# Patient Record
Sex: FEMALE | URBAN - METROPOLITAN AREA
[De-identification: names, ages, dates, MRNs, and addresses within clinical notes are randomized per-mention and may not be internally consistent; named-entity substitution may affect disease eponyms.]

---

## 2018-08-02 ENCOUNTER — PRE VISIT (OUTPATIENT)
Dept: ORTHOPEDICS | Facility: CLINIC | Age: 33
End: 2018-08-02

## 2018-08-02 NOTE — TELEPHONE ENCOUNTER
FUTURE VISIT INFORMATION      FUTURE VISIT INFORMATION:    Date: 8/6    Time: 1:00    [place] [facility] [place]  REFERRAL INFORMATION:    Referring provider:      Referring providers clinic:      Reason for visit/diagnosis  L knee pain    RECORDS REQUESTED FROM:       Clinic name Comments Records Status Imaging Status                                         RECORDS STATUS      No records in chart. Left vm with pt regarding any records or images that she can bring with her

## 2018-08-06 ENCOUNTER — RADIANT APPOINTMENT (OUTPATIENT)
Dept: GENERAL RADIOLOGY | Facility: CLINIC | Age: 33
End: 2018-08-06
Attending: ORTHOPAEDIC SURGERY
Payer: COMMERCIAL

## 2018-08-06 ENCOUNTER — OFFICE VISIT (OUTPATIENT)
Dept: ORTHOPEDICS | Facility: CLINIC | Age: 33
End: 2018-08-06
Payer: COMMERCIAL

## 2018-08-06 VITALS — WEIGHT: 142 LBS | HEIGHT: 60 IN | BODY MASS INDEX: 27.88 KG/M2

## 2018-08-06 DIAGNOSIS — M25.562 CHRONIC PAIN OF LEFT KNEE: Primary | ICD-10-CM

## 2018-08-06 DIAGNOSIS — G89.29 CHRONIC PAIN OF LEFT KNEE: Primary | ICD-10-CM

## 2018-08-06 DIAGNOSIS — M25.562 CHRONIC PAIN OF LEFT KNEE: ICD-10-CM

## 2018-08-06 DIAGNOSIS — G89.29 CHRONIC PAIN OF LEFT KNEE: ICD-10-CM

## 2018-08-06 DIAGNOSIS — M17.32 POST-TRAUMATIC OSTEOARTHRITIS OF LEFT KNEE: ICD-10-CM

## 2018-08-06 RX ORDER — CHOLECALCIFEROL (VITAMIN D3) 50 MCG
2000 TABLET ORAL
COMMUNITY
End: 2018-08-06

## 2018-08-06 RX ORDER — ALPRAZOLAM 0.25 MG
0.25 TABLET ORAL
COMMUNITY
Start: 2017-10-26

## 2018-08-06 RX ORDER — LIDOCAINE HYDROCHLORIDE 10 MG/ML
2 INJECTION, SOLUTION EPIDURAL; INFILTRATION; INTRACAUDAL; PERINEURAL
Status: SHIPPED | OUTPATIENT
Start: 2018-08-06

## 2018-08-06 RX ORDER — SERTRALINE HYDROCHLORIDE 100 MG/1
200 TABLET, FILM COATED ORAL
COMMUNITY
Start: 2017-10-26

## 2018-08-06 RX ORDER — TRIAMCINOLONE ACETONIDE 40 MG/ML
40 INJECTION, SUSPENSION INTRA-ARTICULAR; INTRAMUSCULAR
Status: SHIPPED | OUTPATIENT
Start: 2018-08-06

## 2018-08-06 RX ORDER — GABAPENTIN 100 MG/1
100 CAPSULE ORAL
COMMUNITY
Start: 2017-10-26

## 2018-08-06 RX ADMIN — LIDOCAINE HYDROCHLORIDE 2 ML: 10 INJECTION, SOLUTION EPIDURAL; INFILTRATION; INTRACAUDAL; PERINEURAL at 14:05

## 2018-08-06 RX ADMIN — TRIAMCINOLONE ACETONIDE 40 MG: 40 INJECTION, SUSPENSION INTRA-ARTICULAR; INTRAMUSCULAR at 14:05

## 2018-08-06 ASSESSMENT — KOOS JR
HOW SEVERE IS YOUR KNEE STIFFNESS AFTER FIRST WAKING IN MORNING: MODERATE
HOW SEVERE IS YOUR KNEE STIFFNESS AFTER FIRST WAKING IN MORNING: 1

## 2018-08-06 NOTE — PROGRESS NOTES
"Large Joint Injection/Arthocentesis  Date/Time: 8/6/2018 2:05 PM  Performed by: VERONICA OVALLE  Authorized by: VERONICA OVALLE     Indications:  Pain  Needle Size:  25 G  Guidance: landmark guided    Approach:  Anterolateral  Location:  Knee  Site:  L knee joint  Medications:  40 mg triamcinolone acetonide 40 MG/ML; 2 mL lidocaine (PF) 1 %  Outcome:  Tolerated well, no immediate complications  Procedure discussed: discussed risks, benefits, and alternatives    Timeout: timeout called immediately prior to procedure    Prep: patient was prepped and draped in usual sterile fashion        C C: left knee pain    HPI:  Abby is seen today with chief complaint of left lateral knee pain. She has a history of a lateral uni done by Dr. Ovalle about 10 years ago with near complete pain relief. Her mother is here today as well and states there has been a 2 week history of worsening knee pain without specific injury, fall, or obvious trauma.Takes occasional advil and does have \"night achiness\". Mild swelling.     PMH: Reviewed in chart 8/6/2018    ROS: Reviewed on patient tablet today 8/6/2018 with all systems negative except for those listed below    Meds/All: Reviewed/updated 8/6/2018    PE:  Pleasant and cooperative female alert and oriented x3. Has spastic lower extremity on gait analysis from CP. Left knee demonstrates mild tenderness to palpate lateral joint line. Nontender to medial joint. Patella has crepitus that is not painful with flexion/extension. No calf pain. Ligament exam is stable. Skin intact without rashes, bruises, or lesions.    Xrays taken today:  1. Postsurgical changes of a right knee lateral femorotibial  unicompartmental arthroplasty.  2. Joint space narrowing in the medial and lateral femorotibial joint  compartment of the left knee.       Dx:  1.stable right knee lateral uniarthroplasty  2. Early/mild osteoarthritis left lateral joint knee    Plan:  1. No surgery recommended at this point. NSAIDS " and a cortisone injection today. Informed consent obtained. Follow up prn.    Total time spent was 30 minutes with greater than 50% spent in face to face consultation and collaboration of care.    Procedure Note    Pre Procedure dx: left knee osteoarthritis    Post Procedure dx: left knee osteoarthritis    Procedure: Informed consent obtained. Using sterile technique, the anterior lateral aspect of the left knee was prepped with chlorahexadine x2. 1cc of 40 mg of kenalog and 2 ml of lidocaine was injected into the lateral joint space without difficulty. Follow up as indicated above.    I, Dr. Don Ovalle, have seen and examined this patient with Sole Banerjee, APRN, CNP.

## 2018-08-06 NOTE — LETTER
"8/6/2018       RE: Abby Aguilera  [address] [address]     Dear Colleague,    Thank you for referring your patient, Abby Aguilera, [facility]. Please see a copy of my visit note below.    Large Joint Injection/Arthocentesis  Date/Time: 8/6/2018 2:05 PM  Performed by: VERONICA OVALLE  Authorized by: VERONICA OVALLE     Indications:  Pain  Needle Size:  25 G  Guidance: landmark guided    Approach:  Anterolateral  Location:  Knee  Site:  L knee joint  Medications:  40 mg triamcinolone acetonide 40 MG/ML; 2 mL lidocaine (PF) 1 %  Outcome:  Tolerated well, no immediate complications  Procedure discussed: discussed risks, benefits, and alternatives    Timeout: timeout called immediately prior to procedure    Prep: patient was prepped and draped in usual sterile fashion        C C: left knee pain    HPI:  Abby is seen today with chief complaint of left lateral knee pain. She has a history of a lateral uni done by Dr. Ovalle about 10 years ago with near complete pain relief. Her mother is here today as well and states there has been a 2 week history of worsening knee pain without specific injury, fall, or obvious trauma.Takes occasional advil and does have \"night achiness\". Mild swelling.     PMH: Reviewed in chart 8/6/2018    ROS: Reviewed on patient tablet today 8/6/2018 with all systems negative except for those listed below    Meds/All: Reviewed/updated 8/6/2018    PE:  Pleasant and cooperative female alert and oriented x3. Has spastic lower extremity on gait analysis from CP. Left knee demonstrates mild tenderness to palpate lateral joint line. Nontender to medial joint. Patella has crepitus that is not painful with flexion/extension. No calf pain. Ligament exam is stable. Skin intact without rashes, bruises, or lesions.    Xrays taken today:  1. Postsurgical changes of a right knee lateral femorotibial  unicompartmental " arthroplasty.  2. Joint space narrowing in the medial and lateral femorotibial joint  compartment of the left knee.       Dx:  1.stable right knee lateral uniarthroplasty  2. Early/mild osteoarthritis left lateral joint knee    Plan:  1. No surgery recommended at this point. NSAIDS and a cortisone injection today. Informed consent obtained. Follow up prn.    Total time spent was 30 minutes with greater than 50% spent in face to face consultation and collaboration of care.    Procedure Note    Pre Procedure dx: left knee osteoarthritis    Post Procedure dx: left knee osteoarthritis    Procedure: Informed consent obtained. Using sterile technique, the anterior lateral aspect of the left knee was prepped with chlorahexadine x2. 1cc of 40 mg of kenalog and 2 ml of lidocaine was injected into the lateral joint space without difficulty. Follow up as indicated above.    I, Dr. Don Ovalle, have seen and examined this patient with ALOK Fletcher, CNP.      Again, thank you for allowing me to participate in the care of your patient.      Sincerely,    Don Ovalle MD

## 2018-08-06 NOTE — MR AVS SNAPSHOT
After Visit Summary   2018    Abby Aguilera    MRN: [id number]           Patient Information     Date Of Birth          1985        Visit Information        Provider Department      2018 1:00 PM Don [facility]le [facility]        Today's Diagnoses     Chronic pain of left knee    -  1    Post-traumatic osteoarthritis of left knee           Follow-ups after your visit        Who to contact     Please call your clinic at [phone] to:    Ask questions about your health    Make or cancel appointments    Discuss your medicines    Learn about your test results    Speak to your doctor            Additional Information About Your Visit        MyChart Information     Workboard is an electronic gateway that provides easy, online access to your medical records. With Workboard, you can request a clinic appointment, read your test results, renew a prescription or communicate with your care team.     To sign up for [employer] visit the website at [email or web address]   You will be asked to enter the access code listed below, as well as some personal information. Please follow the directions to create your username and password.     Your access code is: [email or web address]  Expires: 10/31/2018  6:30 AM     Your access code will  in 90 days. If you need help or a new code, please contact your [facility] or call [phone] for assistance.        Care EveryWhere ID     This is your Care EveryWhere ID. This could be used by other organizations to access your [place] medical records  [id number]        Your Vitals Were     Height BMI (Body Mass Index)                1.524 m (5') 27.73 kg/m2           Blood Pressure from Last 3 Encounters:   No data found for BP    Weight from Last 3 Encounters:   18 64.4 kg (142 lb)              We Performed the Following     Large Joint Injection/Arthocentesis          Today's Medication Changes           These changes are accurate as of 8/6/18  4:30 PM.  If you have any questions, ask your nurse or doctor.               Stop taking these medicines if you haven't already. Please contact your care team if you have questions.     vitamin D 2000 units tablet   Stopped by:  Don Ovalle MD                    Primary Care Provider    None Specified       No primary provider on file.        Equal Access to Services     [facility]: Hadii aad ku hadasho Soomaali, waaxda luqadaha, qaybta kaalmada sarayada, charu sagastumejustynbennie estes . So [facility] [phone].    ATENCIÓN: Si habla español, tiene a camarena disposición servicios gratuitos de asistencia lingüística. Llame al [phone].    We comply with applicable federal civil rights laws and [place] laws. We do not discriminate on the basis of race, color, national origin, age, disability, sex, sexual orientation, or gender identity.            Thank you!     Thank you for choosing [facility]  for your care. Our goal is always to provide you with excellent care. Hearing back from our patients is one way we can continue to improve our services. Please take a few minutes to complete the written survey that you may receive in the mail after your visit with us. Thank you!             Your Updated Medication List - Protect others around you: Learn how to safely use, store and throw away your medicines at www.disposemymeds.org.          This list is accurate as of 8/6/18  4:30 PM.  Always use your most recent med list.                   Brand Name Dispense Instructions for use Diagnosis    ALPRAZolam 0.25 MG tablet    XANAX     Take 0.25 mg by mouth        gabapentin 100 MG capsule    NEURONTIN     Take 100 mg by mouth        sertraline 100 MG tablet    ZOLOFT     Take 200 mg by mouth

## 2018-08-06 NOTE — NURSING NOTE
Reason For Visit:   Chief Complaint   Patient presents with     Knee Pain     Left knee pain for about 2 weeks. No known TRAVIS.            Pain Assessment  Patient Currently in Pain: Yes  0-10 Pain Scale: 4  Primary Pain Location: Knee  Pain Orientation: Left  Pain Descriptors: Aching, Dull  Alleviating Factors: Rest  Aggravating Factors: Movement

## 2018-08-06 NOTE — LETTER
Date:August 7, 2018      Patient was self referred, no letter generated. Do not send.        [facility] Information

## 2019-01-07 ENCOUNTER — TELEPHONE (OUTPATIENT)
Dept: ORTHOPEDICS | Facility: CLINIC | Age: 34
End: 2019-01-07

## 2019-01-07 NOTE — TELEPHONE ENCOUNTER
Mel was phoned back by RN and voicemail was left that Dr Ovalle would refer the pt to podiatry for foot sores.  If pt needed surgery for foot deformity, Dr Ovalle may be recommended.  Until then, foot sores are taken care of by podiatry.  Letha was left my direct dial number fif more questions arise.  Michelle Pruitt RN

## 2019-01-07 NOTE — TELEPHONE ENCOUNTER
[facility]    Phone Message    May a detailed message be left on voicemail: yes    Reason for Call: Other: Pt 's mother called and stated that pt is having a lot of trouble with pain and sores on her feet. Pt's mother wanted to check with  team to see who he would recommend her daughter seeing for this. Pt's Mother felt pt should be seen with an orthopedic surgeon asnd not a podiatrist being her daughter has Cerebral Palsy. Please call pt's mother back with any recommendations.     Action Taken: Message routed to:  [facility] ortho

## 2019-01-22 ENCOUNTER — OFFICE VISIT (OUTPATIENT)
Dept: PODIATRY | Facility: CLINIC | Age: 34
End: 2019-01-22
Payer: COMMERCIAL

## 2019-01-22 ENCOUNTER — ANCILLARY PROCEDURE (OUTPATIENT)
Dept: GENERAL RADIOLOGY | Facility: CLINIC | Age: 34
End: 2019-01-22
Payer: COMMERCIAL

## 2019-01-22 VITALS — SYSTOLIC BLOOD PRESSURE: 114 MMHG | HEART RATE: 89 BPM | OXYGEN SATURATION: 97 % | DIASTOLIC BLOOD PRESSURE: 78 MMHG

## 2019-01-22 DIAGNOSIS — M79.671 RIGHT FOOT PAIN: Primary | ICD-10-CM

## 2019-01-22 DIAGNOSIS — M24.573 EQUINUS CONTRACTURE OF ANKLE: ICD-10-CM

## 2019-01-22 PROCEDURE — 99202 OFFICE O/P NEW SF 15 MIN: CPT | Performed by: PODIATRIST

## 2019-01-22 PROCEDURE — 73630 X-RAY EXAM OF FOOT: CPT | Mod: RT | Performed by: RADIOLOGY

## 2019-01-22 NOTE — LETTER
1/22/2019         RE: Abby Aguilera  [address] [address]        Dear Colleague,    Thank you for referring your patient, Abby Aguilera, to the [facility]. Please see a copy of my visit note below.    No past medical history on file.  There is no problem list on file for this patient.    No past surgical history on file.  Social History     Socioeconomic History     Marital status: Single     Spouse name: Not on file     Number of children: Not on file     Years of education: Not on file     Highest education level: Not on file   Social Needs     Financial resource strain: Not on file     Food insecurity - worry: Not on file     Food insecurity - inability: Not on file     Transportation needs - medical: Not on file     Transportation needs - non-medical: Not on file   Occupational History     Not on file   Tobacco Use     Smoking status: Never Smoker     Smokeless tobacco: Never Used   Substance and Sexual Activity     Alcohol use: Not on file     Drug use: Not on file     Sexual activity: Not on file   Other Topics Concern     Not on file   Social History Narrative     Not on file     No family history on file.  SUBJECTIVE FINDINGS:  A 33-year-old female presents with her mother for right foot pain.  She relates it has been going on for weeks.  She is not exactly sure how.  She relates no injuries.  It kind of hurts on the dorsolateral foot and ankle area.  She uses a walker to get around.  She works on her feet.  If she is working on it too long, it hurts.  It is better with rest.  She relates no treatment.  Left foot:  She has got a callus on it.  She thought maybe it was from the orthotics.  They got the orthotics adjusted and that helped.  She relates she has had Achilles tendon releases in the past.  She has AFOs that she intermittently wears, but they are hard to get on.  She has custom foot orthotics that she wears in her shoes.      PAST MEDICAL HISTORY:  She  sees primary care at [facility].  This includes preventative health care, infantile cerebral palsy, generalized anxiety disorder, obsessive compulsive disorder, chronic pain, left knee, chronic pain, right knee.      OBJECTIVE FINDINGS:  DP and PT are 2/4 bilaterally.  She has decreased ankle joint dorsiflexion, right more so than left.  She has some talar pronation on the right with some prominence on the medial arch.  She has a dorsolateral fifth MPJ prominence on the left with minimal callus on it.  She has hyperkeratotic tissue buildup, plantar left hallux, minimally on the right.  There are no gross tendon voids.  She has pain on palpation of the anterolateral foot and ankle gutter.  There is no erythema, no drainage, no odor, no calor bilaterally.  X-rays reviewed with the patient and mother in clinic today.  There is no fracture, joint cortical margins are intact.  She does have some joint space narrowing on the tarsal and intertarsal joints on the right foot laterally.      ASSESSMENT AND PLAN:  Right foot pain.  She is getting some lateral foot and ankle impingement.  She has got equinus present.  Diagnosis and treatment options discussed with the patient.  Prescription for physical therapy given and use discussed with her.  She will return to clinic and see me in about 6 weeks.         Again, thank you for allowing me to participate in the care of your patient.        Sincerely,        Zhang Banegas DPM

## 2019-01-22 NOTE — PROGRESS NOTES
No past medical history on file.  There is no problem list on file for this patient.    No past surgical history on file.  Social History     Socioeconomic History     Marital status: Single     Spouse name: Not on file     Number of children: Not on file     Years of education: Not on file     Highest education level: Not on file   Social Needs     Financial resource strain: Not on file     Food insecurity - worry: Not on file     Food insecurity - inability: Not on file     Transportation needs - medical: Not on file     Transportation needs - non-medical: Not on file   Occupational History     Not on file   Tobacco Use     Smoking status: Never Smoker     Smokeless tobacco: Never Used   Substance and Sexual Activity     Alcohol use: Not on file     Drug use: Not on file     Sexual activity: Not on file   Other Topics Concern     Not on file   Social History Narrative     Not on file     No family history on file.  SUBJECTIVE FINDINGS:  A 33-year-old female presents with her mother for right foot pain.  She relates it has been going on for weeks.  She is not exactly sure how.  She relates no injuries.  It kind of hurts on the dorsolateral foot and ankle area.  She uses a walker to get around.  She works on her feet.  If she is working on it too long, it hurts.  It is better with rest.  She relates no treatment.  Left foot:  She has got a callus on it.  She thought maybe it was from the orthotics.  They got the orthotics adjusted and that helped.  She relates she has had Achilles tendon releases in the past.  She has AFOs that she intermittently wears, but they are hard to get on.  She has custom foot orthotics that she wears in her shoes.      PAST MEDICAL HISTORY:  She sees primary care at [facility].  This includes preventative health care, infantile cerebral palsy, generalized anxiety disorder, obsessive compulsive disorder, chronic pain, left knee, chronic pain, right knee.      OBJECTIVE FINDINGS:  DP and  PT are 2/4 bilaterally.  She has decreased ankle joint dorsiflexion, right more so than left.  She has some talar pronation on the right with some prominence on the medial arch.  She has a dorsolateral fifth MPJ prominence on the left with minimal callus on it.  She has hyperkeratotic tissue buildup, plantar left hallux, minimally on the right.  There are no gross tendon voids.  She has pain on palpation of the anterolateral foot and ankle gutter.  There is no erythema, no drainage, no odor, no calor bilaterally.  X-rays reviewed with the patient and mother in clinic today.  There is no fracture, joint cortical margins are intact.  She does have some joint space narrowing on the tarsal and intertarsal joints on the right foot laterally.      ASSESSMENT AND PLAN:  Right foot pain.  She is getting some lateral foot and ankle impingement.  She has got equinus present.  Diagnosis and treatment options discussed with the patient.  Prescription for physical therapy given and use discussed with her.  She will return to clinic and see me in about 6 weeks.

## 2019-01-22 NOTE — NURSING NOTE
Abby Aguilera's chief complaint for this visit includes:  Chief Complaint   Patient presents with     Consult For     foot pain     PCP: Carla Sheehan    Referring Provider:  No referring provider defined for this encounter.    /78   Pulse 89   SpO2 97%   Data Unavailable     Do you need any medication refills at today's visit? no

## 2019-01-22 NOTE — PATIENT INSTRUCTIONS
Thanks for coming today.  [facility] Clinic  [address] [place] [address]    To schedule future appointments in Ortho Clinic, you may call [phone].    To schedule ordered imaging by your Provider: Call [facility] at [phone]    [email or web address] available online at:   [email or web address]    Please call if any further questions or concerns [phone] and ask for the Orthopedic Department. Clinic hours 8 am to 5 pm.    Return to clinic if symptoms worsen.

## 2019-03-05 ENCOUNTER — OFFICE VISIT (OUTPATIENT)
Dept: PODIATRY | Facility: CLINIC | Age: 34
End: 2019-03-05
Payer: COMMERCIAL

## 2019-03-05 VITALS — SYSTOLIC BLOOD PRESSURE: 116 MMHG | DIASTOLIC BLOOD PRESSURE: 76 MMHG | OXYGEN SATURATION: 97 % | HEART RATE: 70 BPM

## 2019-03-05 DIAGNOSIS — M24.573 EQUINUS CONTRACTURE OF ANKLE: ICD-10-CM

## 2019-03-05 DIAGNOSIS — M79.671 RIGHT FOOT PAIN: Primary | ICD-10-CM

## 2019-03-05 PROCEDURE — 99212 OFFICE O/P EST SF 10 MIN: CPT | Performed by: PODIATRIST

## 2019-03-05 NOTE — LETTER
3/5/2019         RE: Abby Aguilera  [address] [address]        Dear Colleague,    Thank you for referring your patient, Abby Aguilera, to the [email or web address] Please see a copy of my visit note below.    No past medical history on file.  There is no problem list on file for this patient.    No past surgical history on file.  Social History     Socioeconomic History     Marital status: Single     Spouse name: Not on file     Number of children: Not on file     Years of education: Not on file     Highest education level: Not on file   Occupational History     Not on file   Social Needs     Financial resource strain: Not on file     Food insecurity:     Worry: Not on file     Inability: Not on file     Transportation needs:     Medical: Not on file     Non-medical: Not on file   Tobacco Use     Smoking status: Never Smoker     Smokeless tobacco: Never Used   Substance and Sexual Activity     Alcohol use: Not on file     Drug use: Not on file     Sexual activity: Not on file   Lifestyle     Physical activity:     Days per week: Not on file     Minutes per session: Not on file     Stress: Not on file   Relationships     Social connections:     Talks on phone: Not on file     Gets together: Not on file     Attends Mosque service: Not on file     Active member of club or organization: Not on file     Attends meetings of clubs or organizations: Not on file     Relationship status: Not on file     Intimate partner violence:     Fear of current or ex partner: Not on file     Emotionally abused: Not on file     Physically abused: Not on file     Forced sexual activity: Not on file   Other Topics Concern     Not on file   Social History Narrative     Not on file     No family history on file.  SUBJECTIVE:  A 33-year-old returns to clinic for right foot pain and lateral ankle impingement.  She relates it is better.  She has AFOs and foot orthotics.  She has seen her orthotist.  She has  been seeing physical therapy and feels that has helped.        OBJECTIVE:  Skin is dry and intact on the right.  There is no erythema, no drainage, no odor and no calor.  She still relates there is some pain along the peroneal tendon course.        ASSESSMENT/PLAN:  Right foot pain.  She is getting lateral foot and ankle impingement.  She has equinus present.  Diagnosis and treatment options discussed with her.  This is improved.  Continue physical therapy and bracing.  Return to clinic and see me as needed.         Again, thank you for allowing me to participate in the care of your patient.        Sincerely,        Zhang Banegas DPM

## 2019-03-05 NOTE — NURSING NOTE
Abby Aguilera's chief complaint for this visit includes:  Chief Complaint   Patient presents with     RECHECK     foot pain-improving     PCP: Carla Sheehan    Referring Provider:  No referring provider defined for this encounter.    /76   Pulse 70   SpO2 97%   Data Unavailable     Do you need any medication refills at today's visit? no

## 2019-03-05 NOTE — PATIENT INSTRUCTIONS
Thanks for coming today.  Ortho[facility] Clinic  [address] [place] [address]    To schedule future appointments in Ortho Clinic, you may call [phone].    To schedule ordered imaging by your Provider: Call [facility] at [phone]    [employer] available online at:   [email or web address]    Please call if any further questions or concerns [phone] and ask for the Orthopedic Department. Clinic hours 8 am to 5 pm.    Return to clinic if symptoms worsen.

## 2019-03-05 NOTE — PROGRESS NOTES
No past medical history on file.  There is no problem list on file for this patient.    No past surgical history on file.  Social History     Socioeconomic History     Marital status: Single     Spouse name: Not on file     Number of children: Not on file     Years of education: Not on file     Highest education level: Not on file   Occupational History     Not on file   Social Needs     Financial resource strain: Not on file     Food insecurity:     Worry: Not on file     Inability: Not on file     Transportation needs:     Medical: Not on file     Non-medical: Not on file   Tobacco Use     Smoking status: Never Smoker     Smokeless tobacco: Never Used   Substance and Sexual Activity     Alcohol use: Not on file     Drug use: Not on file     Sexual activity: Not on file   Lifestyle     Physical activity:     Days per week: Not on file     Minutes per session: Not on file     Stress: Not on file   Relationships     Social connections:     Talks on phone: Not on file     Gets together: Not on file     Attends Shinto service: Not on file     Active member of club or organization: Not on file     Attends meetings of clubs or organizations: Not on file     Relationship status: Not on file     Intimate partner violence:     Fear of current or ex partner: Not on file     Emotionally abused: Not on file     Physically abused: Not on file     Forced sexual activity: Not on file   Other Topics Concern     Not on file   Social History Narrative     Not on file     No family history on file.  SUBJECTIVE:  A 33-year-old returns to clinic for right foot pain and lateral ankle impingement.  She relates it is better.  She has AFOs and foot orthotics.  She has seen her orthotist.  She has been seeing physical therapy and feels that has helped.        OBJECTIVE:  Skin is dry and intact on the right.  There is no erythema, no drainage, no odor and no calor.  She still relates there is some pain along the peroneal tendon course.         ASSESSMENT/PLAN:  Right foot pain.  She is getting lateral foot and ankle impingement.  She has equinus present.  Diagnosis and treatment options discussed with her.  This is improved.  Continue physical therapy and bracing.  Return to clinic and see me as needed.

## 2019-03-07 DIAGNOSIS — Z53.9 ERRONEOUS ENCOUNTER--DISREGARD: Primary | ICD-10-CM

## 2019-03-11 ENCOUNTER — OFFICE VISIT (OUTPATIENT)
Dept: ORTHOPEDICS | Facility: CLINIC | Age: 34
End: 2019-03-11
Payer: COMMERCIAL

## 2019-03-11 DIAGNOSIS — M17.12 PRIMARY LOCALIZED OSTEOARTHRITIS OF LEFT KNEE: Primary | ICD-10-CM

## 2019-03-11 RX ORDER — LIDOCAINE HYDROCHLORIDE 10 MG/ML
2 INJECTION, SOLUTION EPIDURAL; INFILTRATION; INTRACAUDAL; PERINEURAL
Status: SHIPPED | OUTPATIENT
Start: 2019-03-11

## 2019-03-11 RX ORDER — TRIAMCINOLONE ACETONIDE 40 MG/ML
40 INJECTION, SUSPENSION INTRA-ARTICULAR; INTRAMUSCULAR
Status: SHIPPED | OUTPATIENT
Start: 2019-03-11

## 2019-03-11 RX ADMIN — TRIAMCINOLONE ACETONIDE 40 MG: 40 INJECTION, SUSPENSION INTRA-ARTICULAR; INTRAMUSCULAR at 15:35

## 2019-03-11 RX ADMIN — LIDOCAINE HYDROCHLORIDE 2 ML: 10 INJECTION, SOLUTION EPIDURAL; INFILTRATION; INTRACAUDAL; PERINEURAL at 15:35

## 2019-03-11 ASSESSMENT — ENCOUNTER SYMPTOMS
NERVOUS/ANXIOUS: 1
MUSCLE CRAMPS: 0
STIFFNESS: 1
MYALGIAS: 1
JOINT SWELLING: 1
ARTHRALGIAS: 1
MUSCLE WEAKNESS: 1

## 2019-03-11 NOTE — LETTER
3/11/2019       RE: Abby Aguilera  [address] [facility] [address]     Dear Colleague,    Thank you for referring your patient, Abby Aguilera, to the [facility]. Please see a copy of my visit note below.      HISTORY  He returns to us today 7 months after her last review.  She has a history of cerebral palsy, and is previously had a right unicompartmental lateral knee arthroplasty.  She had an episode of lateral left-sided knee pain.  She was treated with corticosteroid injection.  This provided her with almost 4 months of relief, and while the pain and giving way symptoms have recurred, and not as bad as they were previously.  She also reports some right lateral foot and ankle pain, for which she has seen a podiatrist in recent weeks.    No active treatment is been instigated for this beyond some physical therapy.    REVIEW OF SYSTEMS / PAST HISTORY  10 point review of systems conducted and reveals no change from previous      EXAMINATION  The patient presents alert and oriented with normal affect.  Circulatory status of the limb is normal. Neurological examination of the affected limb reveals increased tone and markedly abnormal gait in keeping with her underlying condition.    With regard to the left knee there is 0-115 degrees of range.  She has tenderness over the lateral joint    IMAGING   Radiographs demonstrate lateral joint space narrowing but not as much as on her right side to surgery    ASSESSMENT  Abby has early degenerative changes in the lateral compartment of her left knee.  At this stage we will continue to manage her with corticosteroid injections which she is previously had a good response.  Will arrange for this today.    - Dr. Mike Samano (Orthopaedic Fellow)     Total time spent was 20 minutes with greater than 50% spent in face to face consultation and collaboration of care.      Procedure Note    Pre  Procedure dx: left knee osteoarthritis    Post Procedure dx: left knee osteoarthritis    Procedure: Informed consent obtained. Using sterile technique, the anterior lateral aspect of the left knee was prepped with chlorahexadine x2. 1cc of 40 mg of kenalog and 2 ml of lidocaine was injected into the lateral compartment without difficulty. Tolerated well and will follow up as recommended above.    [facility]  [address] [address]  [phone]  Dept: [phone]  ______________________________________________________________________________    Patient: Abby Aguilera   : 1985   MRN: [id number]   2019    INVASIVE PROCEDURE SAFETY CHECKLIST    Date: 3/11/2019   Procedure:Left knee CSI  Patient Name: Abby Aguilera  MRN: [id number]  YOB: 1985    Action: Complete sections as appropriate. Any discrepancy results in a HARD COPY until resolved.     PRE PROCEDURE:  Patient ID verified with 2 identifiers (name and  or MRN): Yes  Procedure and site verified with patient/designee (when able): Yes  Accurate consent documentation in medical record: Yes  H&P (or appropriate assessment) documented in medical record: Yes  H&P must be up to 20 days prior to procedure and updates within 24 hours of procedure as applicable: Yes  Relevant diagnostic and radiology test results appropriately labeled and displayed as applicable: Yes  Procedure site(s) marked with provider initials: Yes    TIMEOUT:  Time-Out performed immediately prior to starting procedure, including verbal and active participation of all team members addressing the following:Yes  * Correct patient identify  * Confirmed that the correct side and site are marked  * An accurate procedure consent form  * Agreement on the procedure to be done  * Correct patient position  * Relevant images and results are properly labeled and appropriately displayed  * The need to administer  antibiotics or fluids for irrigation purposes during the procedure as applicable   * Safety precautions based on patient history or medication use    DURING PROCEDURE: Verification of correct person, site, and procedures any time the responsibility for care of the patient is transferred to another member of the care team.     Large Joint Injection/Arthocentesis: L knee joint  Date/Time: 3/11/2019 3:35 PM  Performed by: Mike Samano MD  Authorized by: Don Ovalle MD     Indications:  Pain  Needle Size:  25 G  Guidance: landmark guided    Approach:  Lateral  Location:  Knee  Site:  L knee joint  Medications:  2 mL lidocaine (PF) 1 %; 40 mg triamcinolone 40 MG/ML  Outcome:  Tolerated well, no immediate complications  Procedure discussed: discussed risks, benefits, and alternatives    Consent Given by:  Patient  Timeout: timeout called immediately prior to procedure    Prep: patient was prepped and draped in usual sterile fashion      Again, thank you for allowing me to participate in the care of your patient.      Sincerely,    Don Ovalle MD

## 2019-03-11 NOTE — PROGRESS NOTES
[facility]  [address] [place] [id number]  [phone]  Dept: [phone]  ______________________________________________________________________________    Patient: Abby Aguilera   : 1985   MRN: [id number]   2019    INVASIVE PROCEDURE SAFETY CHECKLIST    Date: 3/11/2019   Procedure:Left knee CSI  Patient Name: Abby Aguilera  MRN: [id number]  YOB: 1985    Action: Complete sections as appropriate. Any discrepancy results in a HARD COPY until resolved.     PRE PROCEDURE:  Patient ID verified with 2 identifiers (name and  or MRN): Yes  Procedure and site verified with patient/designee (when able): Yes  Accurate consent documentation in medical record: Yes  H&P (or appropriate assessment) documented in medical record: Yes  H&P must be up to 20 days prior to procedure and updates within 24 hours of procedure as applicable: Yes  Relevant diagnostic and radiology test results appropriately labeled and displayed as applicable: Yes  Procedure site(s) marked with provider initials: Yes    TIMEOUT:  Time-Out performed immediately prior to starting procedure, including verbal and active participation of all team members addressing the following:Yes  * Correct patient identify  * Confirmed that the correct side and site are marked  * An accurate procedure consent form  * Agreement on the procedure to be done  * Correct patient position  * Relevant images and results are properly labeled and appropriately displayed  * The need to administer antibiotics or fluids for irrigation purposes during the procedure as applicable   * Safety precautions based on patient history or medication use    DURING PROCEDURE: Verification of correct person, site, and procedures any time the responsibility for care of the patient is transferred to another member of the care team.     Large Joint Injection/Arthocentesis: L knee joint  Date/Time: 3/11/2019 3:35  PM  Performed by: Mike Samano MD  Authorized by: Don Ovalle MD     Indications:  Pain  Needle Size:  25 G  Guidance: landmark guided    Approach:  Lateral  Location:  Knee  Site:  L knee joint  Medications:  2 mL lidocaine (PF) 1 %; 40 mg triamcinolone 40 MG/ML  Outcome:  Tolerated well, no immediate complications  Procedure discussed: discussed risks, benefits, and alternatives    Consent Given by:  Patient  Timeout: timeout called immediately prior to procedure    Prep: patient was prepped and draped in usual sterile fashion

## 2019-03-11 NOTE — PROGRESS NOTES
HISTORY  He returns to us today 7 months after her last review.  She has a history of cerebral palsy, and is previously had a right unicompartmental lateral knee arthroplasty.  She had an episode of lateral left-sided knee pain.  She was treated with corticosteroid injection.  This provided her with almost 4 months of relief, and while the pain and giving way symptoms have recurred, and not as bad as they were previously.  She also reports some right lateral foot and ankle pain, for which she has seen a podiatrist in recent weeks.    No active treatment is been instigated for this beyond some physical therapy.    REVIEW OF SYSTEMS / PAST HISTORY  10 point review of systems conducted and reveals no change from previous      EXAMINATION  The patient presents alert and oriented with normal affect.  Circulatory status of the limb is normal. Neurological examination of the affected limb reveals increased tone and markedly abnormal gait in keeping with her underlying condition.    With regard to the left knee there is 0-115 degrees of range.  She has tenderness over the lateral joint    IMAGING   Radiographs demonstrate lateral joint space narrowing but not as much as on her right side to surgery    ASSESSMENT  Abby has early degenerative changes in the lateral compartment of her left knee.  At this stage we will continue to manage her with corticosteroid injections which she is previously had a good response.  Will arrange for this today.    - Dr. Mike Samano (Orthopaedic Fellow)     Answers for HPI/ROS submitted by the patient on 3/11/2019   General Symptoms: No  Skin Symptoms: No  HENT Symptoms: No  EYE SYMPTOMS: No  HEART SYMPTOMS: No  LUNG SYMPTOMS: No  INTESTINAL SYMPTOMS: No  URINARY SYMPTOMS: No  GYNECOLOGIC SYMPTOMS: No  BREAST SYMPTOMS: No  SKELETAL SYMPTOMS: Yes  BLOOD SYMPTOMS: No  NERVOUS SYSTEM SYMPTOMS: No  MENTAL HEALTH SYMPTOMS: Yes  Muscle aches: Yes  Swollen joints: Yes  Joint pain:  Yes  Muscle cramps: No  Muscle weakness: Yes  Joint stiffness: Yes  Nervous or Anxious: Yes      Total time spent was 20 minutes with greater than 50% spent in face to face consultation and collaboration of care.      Procedure Note    Pre Procedure dx: left knee osteoarthritis    Post Procedure dx: left knee osteoarthritis    Procedure: Informed consent obtained. Using sterile technique, the anterior lateral aspect of the left knee was prepped with chlorahexadine x2. 1cc of 40 mg of kenalog and 2 ml of lidocaine was injected into the lateral compartment without difficulty. Tolerated well and will follow up as recommended above.

## 2019-07-19 NOTE — TELEPHONE ENCOUNTER
Return patient. Left knee pain. Injection follow-up. See office notes from Dr. Ovalle (Most recent 03/11/19).

## 2019-08-19 ENCOUNTER — PRE VISIT (OUTPATIENT)
Dept: ORTHOPEDICS | Facility: CLINIC | Age: 34
End: 2019-08-19

## 2019-08-19 ENCOUNTER — ANCILLARY PROCEDURE (OUTPATIENT)
Dept: GENERAL RADIOLOGY | Facility: CLINIC | Age: 34
End: 2019-08-19
Attending: ORTHOPAEDIC SURGERY
Payer: MEDICARE

## 2019-08-19 ENCOUNTER — OFFICE VISIT (OUTPATIENT)
Dept: ORTHOPEDICS | Facility: CLINIC | Age: 34
End: 2019-08-19
Payer: MEDICARE

## 2019-08-19 VITALS — WEIGHT: 163.3 LBS | BODY MASS INDEX: 30.05 KG/M2 | HEIGHT: 62 IN

## 2019-08-19 DIAGNOSIS — M25.569 KNEE PAIN: ICD-10-CM

## 2019-08-19 DIAGNOSIS — M17.12 PRIMARY LOCALIZED OSTEOARTHRITIS OF LEFT KNEE: Primary | ICD-10-CM

## 2019-08-19 RX ORDER — LIDOCAINE HYDROCHLORIDE 10 MG/ML
2 INJECTION, SOLUTION EPIDURAL; INFILTRATION; INTRACAUDAL; PERINEURAL
Status: SHIPPED | OUTPATIENT
Start: 2019-08-19

## 2019-08-19 RX ORDER — TRIAMCINOLONE ACETONIDE 40 MG/ML
40 INJECTION, SUSPENSION INTRA-ARTICULAR; INTRAMUSCULAR
Status: SHIPPED | OUTPATIENT
Start: 2019-08-19

## 2019-08-19 RX ADMIN — LIDOCAINE HYDROCHLORIDE 2 ML: 10 INJECTION, SOLUTION EPIDURAL; INFILTRATION; INTRACAUDAL; PERINEURAL at 10:25

## 2019-08-19 RX ADMIN — TRIAMCINOLONE ACETONIDE 40 MG: 40 INJECTION, SUSPENSION INTRA-ARTICULAR; INTRAMUSCULAR at 10:25

## 2019-08-19 ASSESSMENT — ENCOUNTER SYMPTOMS
MUSCLE CRAMPS: 1
MYALGIAS: 1
INSOMNIA: 0
ARTHRALGIAS: 1
MUSCLE WEAKNESS: 1
STIFFNESS: 1
DECREASED CONCENTRATION: 1
NECK PAIN: 0
BACK PAIN: 0
PANIC: 0
JOINT SWELLING: 1
NERVOUS/ANXIOUS: 1
DEPRESSION: 0

## 2019-08-19 ASSESSMENT — MIFFLIN-ST. JEOR: SCORE: 1407.22

## 2019-08-19 NOTE — PROGRESS NOTES
Large Joint Injection/Arthocentesis: L knee joint  Date/Time: 2019 10:25 AM  Performed by: Don Ovalle MD  Authorized by: Don Ovalle MD     Indications:  Pain  Needle Size:  22 G  Guidance: landmark guided    Location:  Knee      Medications:  40 mg triamcinolone 40 MG/ML; 2 mL lidocaine (PF) 1 %  Outcome:  Tolerated well, no immediate complications  Consent Given by:  Patient  Timeout: timeout called immediately prior to procedure    Prep: patient was prepped and draped in usual sterile fashion          [facility] [address] [address]  [phone]  Dept: [phone]  ______________________________________________________________________________    Patient: Abby Aguilera   : 1985   MRN: [id number]   2019    INVASIVE PROCEDURE SAFETY CHECKLIST    Date: 2019   Procedure:Left knee joint injection  Patient Name: Abby Aguilera  MRN: [id number]  YOB: 1985    Action: Complete sections as appropriate. Any discrepancy results in a HARD COPY until resolved.     PRE PROCEDURE:  Patient ID verified with 2 identifiers (name and  or MRN): Yes  Procedure and site verified with patient/designee (when able): Yes  Accurate consent documentation in medical record: Yes  H&P (or appropriate assessment) documented in medical record: Yes  H&P must be up to 20 days prior to procedure and updates within 24 hours of procedure as applicable: Yes  Relevant diagnostic and radiology test results appropriately labeled and displayed as applicable: Yes  Procedure site(s) marked with provider initials: Yes    TIMEOUT:  Time-Out performed immediately prior to starting procedure, including verbal and active participation of all team members addressing the following:Yes  * Correct patient identify  * Confirmed that the correct side and site are marked  * An accurate procedure consent form  * Agreement on the procedure to be  done  * Correct patient position  * Relevant images and results are properly labeled and appropriately displayed  * The need to administer antibiotics or fluids for irrigation purposes during the procedure as applicable   * Safety precautions based on patient history or medication use    DURING PROCEDURE: Verification of correct person, site, and procedures any time the responsibility for care of the patient is transferred to another member of the care team.       The following medications were given:         Prior to injection, verified patient identity using patient's name and date of birth.  Due to injection administration, patient instructed to remain in clinic for 15 minutes  afterwards, and to report any adverse reaction to me immediately.    Joint injection was performed.    Medication Name: Lidocaine  Drug Amount Wasted:  Yes: 3 mg/ml   Vial/Syringe: Single dose vial  Expiration Date:  1/1/23        Yola Kim ATC      Answers for HPI/ROS submitted by the patient on 8/19/2019   General Symptoms: No  Skin Symptoms: No  HENT Symptoms: No  EYE SYMPTOMS: No  HEART SYMPTOMS: No  LUNG SYMPTOMS: No  INTESTINAL SYMPTOMS: No  URINARY SYMPTOMS: No  GYNECOLOGIC SYMPTOMS: No  BREAST SYMPTOMS: No  SKELETAL SYMPTOMS: Yes  BLOOD SYMPTOMS: No  NERVOUS SYSTEM SYMPTOMS: No  MENTAL HEALTH SYMPTOMS: Yes  Back pain: No  Muscle aches: Yes  Neck pain: No  Swollen joints: Yes  Joint pain: Yes  Bone pain: No  Muscle cramps: Yes  Muscle weakness: Yes  Joint stiffness: Yes  Bone fracture: No  Nervous or Anxious: Yes  Depression: No  Trouble sleeping: No  Trouble thinking or concentrating: Yes  Mood changes: No  Panic attacks: No

## 2019-08-19 NOTE — LETTER
8/19/2019       RE: Abby Aguilera  [address] [address] [place] [id number]     Dear Colleague,    Thank you for referring your patient, Abby Aguilera, to the [facility]. Please see a copy of my visit note below.    CC: left knee pain    HPI:   Abby is seen today in follow-up of her left knee pain.  She has a history of cerebral palsy and also has a history of a right unicompartmental lateral knee arthroplasty done by Dr. barker in the past which continues to to function normally with good pain control.  The last injection we gave her gave her 4 to 5 months relief into her left knee.  Now she states that the pain is worsening with giving way type sensation.  She does not have rest pain she does not take any prescription medications for the knee pain but will take occasional ibuprofen.    PMH: Reviewed from patient chat today 8/19/2019    ROS: Reviewed from patient tablet today 8/19/2019 with all systems negative except for those listed below.    PE:   Abby is a pleasant cooperative 34-year-old female.  She is 5 foot 2 and weighs 163 pounds.  She has a BMI of 29.  She has an intact neurocirculatory status of lower extremity with a normal neurological exam except for increased tone markedly abnormal gait in keeping with her underlying condition.  Left knee skin is intact with no open areas ecchymosis rashes bruises or lesions.  Ligament exam was not tested.ROM:0-118.  There is no significant joint effusion and no focal areas of tenderness.    X-rays were reviewed today shows lateral joint space narrowing there is remain relatively stable compared to previous x-rays.  Medial and patellofemoral compartment appear to be intact and well-preserved.    Diagnosis:    1.  Cerebral palsy    2.  Left knee lateral compartment Teressa arthritis moderate    Plan at this time the joint degenerative changes are not yet at the point where surgery will be  discussed.  We will continue to manage her with corticosteroid injections.  Informed consent was obtained we will do this today.    She will follow-up with us PRN.    Total time spent was 30 minutes with greater than 50% spent in face-to-face consultation in collaboration of care.    Procedure note    Preprocedure diagnosis:    Left knee lateral compartment osteoarthritis    Post procedure injection: Left knee lateral compartment osteophytes    Procedure:    Informed consent was obtained.  Using sterile technique, the anterior medial aspect of the left knee was prepped with chlorhexidine x2.  1 cc of 40 mg Kenalog and 2 cc lidocaine were injected in the medial joint space without difficulty.  Patient tolerated procedure well will follow-up as recommended above.    I, Dr. Don Ovalle, agree with above plan of care and examined the patient.      Large Joint Injection/Arthocentesis: L knee joint  Date/Time: 2019 10:25 AM  Performed by: Don Ovalle MD  Authorized by: Don Ovalle MD     Indications:  Pain  Needle Size:  22 G  Guidance: landmark guided    Location:  Knee      Medications:  40 mg triamcinolone 40 MG/ML; 2 mL lidocaine (PF) 1 %  Outcome:  Tolerated well, no immediate complications  Consent Given by:  Patient  Timeout: timeout called immediately prior to procedure    Prep: patient was prepped and draped in usual sterile fashion          [facility]  [address] [place] [id number] [phone]  Dept: [phone]  ______________________________________________________________________________    Patient: Abby Aguilera   : 1985   MRN: [id number]   2019    INVASIVE PROCEDURE SAFETY CHECKLIST    Date: 2019   Procedure:Left knee joint injection  Patient Name: Abby Aguilera  MRN: [id number]  YOB: 1985    Action: Complete sections as appropriate. Any discrepancy results in a HARD COPY until resolved.     PRE  PROCEDURE:  Patient ID verified with 2 identifiers (name and  or MRN): Yes  Procedure and site verified with patient/designee (when able): Yes  Accurate consent documentation in medical record: Yes  H&P (or appropriate assessment) documented in medical record: Yes  H&P must be up to 20 days prior to procedure and updates within 24 hours of procedure as applicable: Yes  Relevant diagnostic and radiology test results appropriately labeled and displayed as applicable: Yes  Procedure site(s) marked with provider initials: Yes    TIMEOUT:  Time-Out performed immediately prior to starting procedure, including verbal and active participation of all team members addressing the following:Yes  * Correct patient identify  * Confirmed that the correct side and site are marked  * An accurate procedure consent form  * Agreement on the procedure to be done  * Correct patient position  * Relevant images and results are properly labeled and appropriately displayed  * The need to administer antibiotics or fluids for irrigation purposes during the procedure as applicable   * Safety precautions based on patient history or medication use    DURING PROCEDURE: Verification of correct person, site, and procedures any time the responsibility for care of the patient is transferred to another member of the care team.       The following medications were given:         Prior to injection, verified patient identity using patient's name and date of birth.  Due to injection administration, patient instructed to remain in clinic for 15 minutes  afterwards, and to report any adverse reaction to me immediately.    Joint injection was performed.    Medication Name: Lidocaine  Drug Amount Wasted:  Yes: 3 mg/ml   Vial/Syringe: Single dose vial  Expiration Date:  23        Yola Kim ATC    Again, thank you for allowing me to participate in the care of your patient.      Sincerely,    Don Ovalle MD

## 2019-08-19 NOTE — NURSING NOTE
"Reason For Visit:   Chief Complaint   Patient presents with     RECHECK     f/u left knee pain injection       Ht 1.58 m (5' 2.21\")   Wt 74.1 kg (163 lb 4.8 oz)   BMI 29.67 kg/m      Pain Assessment  Patient Currently in Pain: Yes  0-10 Pain Scale: 7  Primary Pain Location: Left Knee    Jaci Ruiz ATC    "

## 2019-08-19 NOTE — PROGRESS NOTES
CC: left knee pain    HPI:   Abby is seen today in follow-up of her left knee pain.  She has a history of cerebral palsy and also has a history of a right unicompartmental lateral knee arthroplasty done by Dr. barker in the past which continues to to function normally with good pain control.  The last injection we gave her gave her 4 to 5 months relief into her left knee.  Now she states that the pain is worsening with giving way type sensation.  She does not have rest pain she does not take any prescription medications for the knee pain but will take occasional ibuprofen.    PMH: Reviewed from patient chat today 8/19/2019    ROS: Reviewed from patient tablet today 8/19/2019 with all systems negative except for those listed below.    PE:   Abby is a pleasant cooperative 34-year-old female.  She is 5 foot 2 and weighs 163 pounds.  She has a BMI of 29.  She has an intact neurocirculatory status of lower extremity with a normal neurological exam except for increased tone markedly abnormal gait in keeping with her underlying condition.  Left knee skin is intact with no open areas ecchymosis rashes bruises or lesions.  Ligament exam was not tested.ROM:0-118.  There is no significant joint effusion and no focal areas of tenderness.    X-rays were reviewed today shows lateral joint space narrowing there is remain relatively stable compared to previous x-rays.  Medial and patellofemoral compartment appear to be intact and well-preserved.    Diagnosis:    1.  Cerebral palsy    2.  Left knee lateral compartment Teressa arthritis moderate    Plan at this time the joint degenerative changes are not yet at the point where surgery will be discussed.  We will continue to manage her with corticosteroid injections.  Informed consent was obtained we will do this today.    She will follow-up with us PRN.    Total time spent was 30 minutes with greater than 50% spent in face-to-face consultation in collaboration of [facility].    Procedure  note    Preprocedure diagnosis:    Left knee lateral compartment osteoarthritis    Post procedure injection: Left knee lateral compartment osteophytes    Procedure:    Informed consent was obtained.  Using sterile technique, the anterior medial aspect of the left knee was prepped with chlorhexidine x2.  1 cc of 40 mg Kenalog and 2 cc lidocaine were injected in the medial joint space without difficulty.  Patient tolerated procedure well will follow-up as recommended above.  Answers for HPI/ROS submitted by the patient on 8/19/2019   General Symptoms: No  Skin Symptoms: No  HENT Symptoms: No  EYE SYMPTOMS: No  HEART SYMPTOMS: No  LUNG SYMPTOMS: No  INTESTINAL SYMPTOMS: No  URINARY SYMPTOMS: No  GYNECOLOGIC SYMPTOMS: No  BREAST SYMPTOMS: No  SKELETAL SYMPTOMS: Yes  BLOOD SYMPTOMS: No  NERVOUS SYSTEM SYMPTOMS: No  MENTAL HEALTH SYMPTOMS: Yes  Back pain: No  Muscle aches: Yes  Neck pain: No  Swollen joints: Yes  Joint pain: Yes  Bone pain: No  Muscle cramps: Yes  Muscle weakness: Yes  Joint stiffness: Yes  Bone fracture: No  Nervous or Anxious: Yes  Depression: No  Trouble sleeping: No  Trouble thinking or concentrating: Yes  Mood changes: No  Panic attacks: No    I, Dr. Don Ovalle, agree with above plan of care and examined the patient.

## 2020-02-03 ENCOUNTER — OFFICE VISIT (OUTPATIENT)
Dept: ORTHOPEDICS | Facility: CLINIC | Age: 35
End: 2020-02-03
Payer: COMMERCIAL

## 2020-02-03 VITALS — HEIGHT: 62 IN | WEIGHT: 161.3 LBS | BODY MASS INDEX: 29.68 KG/M2

## 2020-02-03 DIAGNOSIS — M17.12 TRICOMPARTMENT OSTEOARTHRITIS OF LEFT KNEE: Primary | ICD-10-CM

## 2020-02-03 RX ORDER — LIDOCAINE HYDROCHLORIDE 10 MG/ML
1 INJECTION, SOLUTION EPIDURAL; INFILTRATION; INTRACAUDAL; PERINEURAL
Status: SHIPPED | OUTPATIENT
Start: 2020-02-03

## 2020-02-03 RX ORDER — TRIAMCINOLONE ACETONIDE 40 MG/ML
40 INJECTION, SUSPENSION INTRA-ARTICULAR; INTRAMUSCULAR
Status: SHIPPED | OUTPATIENT
Start: 2020-02-03

## 2020-02-03 RX ADMIN — LIDOCAINE HYDROCHLORIDE 1 ML: 10 INJECTION, SOLUTION EPIDURAL; INFILTRATION; INTRACAUDAL; PERINEURAL at 14:39

## 2020-02-03 RX ADMIN — TRIAMCINOLONE ACETONIDE 40 MG: 40 INJECTION, SUSPENSION INTRA-ARTICULAR; INTRAMUSCULAR at 14:39

## 2020-02-03 ASSESSMENT — ENCOUNTER SYMPTOMS
DECREASED LIBIDO: 0
NERVOUS/ANXIOUS: 1
HOT FLASHES: 0
ARTHRALGIAS: 1
DECREASED CONCENTRATION: 0
JOINT SWELLING: 1
MYALGIAS: 1
MUSCLE CRAMPS: 0
BACK PAIN: 0
PANIC: 1
DEPRESSION: 0
NECK PAIN: 0
STIFFNESS: 1
INSOMNIA: 0
MUSCLE WEAKNESS: 1

## 2020-02-03 ASSESSMENT — MIFFLIN-ST. JEOR: SCORE: 1394.9

## 2020-02-03 NOTE — PROGRESS NOTES
Procedure Note    Pre Procedure dx: left knee osteoarthritis    Post  Procedure dx: left knee osteoarthritis    Procedure:  Informed consent obtained. Using sterile technique, the anterior lateral aspect of the left knee was prepped with chlorahexadine x2. 1cc of 40 mg of kenalog and 2 ml of lidocaine was injected into the lateral joint space without difficulty. Tolerated well. Will follow up prn.

## 2020-02-03 NOTE — PROGRESS NOTES
Large Joint Injection/Arthocentesis: L knee joint  Date/Time: 2/3/2020 2:39 PM  Performed by: Don Ovalle MD  Authorized by: Don Ovalle MD     Needle Size:  25 G  Guidance: landmark guided    Location:  Knee      Medications:  40 mg triamcinolone 40 MG/ML; 1 mL lidocaine (PF) 1 %  Outcome:  Tolerated well, no immediate complications  Consent Given by:  Patient  Timeout: timeout called immediately prior to procedure    Prep: patient was prepped and draped in usual sterile fashion          [facility] [address] [address]  [phone]  Dept: [phone]  ______________________________________________________________________________    Patient: Abby Aguilera   : 1985   MRN: [id number]   February 3, 2020    INVASIVE PROCEDURE SAFETY CHECKLIST    Date: 2/3/2020   Procedure:Left knee cortisone injection  Patient Name: Abby Aguilera  MRN: [id number]  YOB: 1985    Action: Complete sections as appropriate. Any discrepancy results in a HARD COPY until resolved.     PRE PROCEDURE:  Patient ID verified with 2 identifiers (name and  or MRN): Yes  Procedure and site verified with patient/designee (when able): Yes  Accurate consent documentation in medical record: Yes  H&P (or appropriate assessment) documented in medical record: NA  H&P must be up to 20 days prior to procedure and updates within 24 hours of procedure as applicable: NA  Relevant diagnostic and radiology test results appropriately labeled and displayed as applicable: NA  Procedure site(s) marked with provider initials: NA    TIMEOUT:  Time-Out performed immediately prior to starting procedure, including verbal and active participation of all team members addressing the following:Yes  * Correct patient identify  * Confirmed that the correct side and site are marked  * An accurate procedure consent form  * Agreement on the procedure to be done  * Correct patient  position  * Relevant images and results are properly labeled and appropriately displayed  * The need to administer antibiotics or fluids for irrigation purposes during the procedure as applicable   * Safety precautions based on patient history or medication use    DURING PROCEDURE: Verification of correct person, site, and procedures any time the responsibility for care of the patient is transferred to another member of the care team.       The following medications were given:         Prior to injection, verified patient identity using patient's name and date of birth.  Due to injection administration, patient instructed to remain in clinic for 15 minutes  afterwards, and to report any adverse reaction to me immediately.    Joint injection was performed.    Medication Name: lidocaine NDC 95535-432-53  Drug Amount Wasted:  Yes: 4 mg/ml   Vial/Syringe: Single dose vial  Expiration Date:  10/23      Scribed by Yola Kim ATC for Dr. Don Ovalle on February 3, 2020 at 1430 based on the provider's   statements to me.     Yola Kim ATC      Answers for HPI/ROS submitted by the patient on 2/3/2020   General Symptoms: No  Skin Symptoms: No  HENT Symptoms: No  EYE SYMPTOMS: No  HEART SYMPTOMS: No  LUNG SYMPTOMS: No  INTESTINAL SYMPTOMS: No  URINARY SYMPTOMS: No  GYNECOLOGIC SYMPTOMS: Yes  BREAST SYMPTOMS: No  SKELETAL SYMPTOMS: Yes  BLOOD SYMPTOMS: No  NERVOUS SYSTEM SYMPTOMS: No  MENTAL HEALTH SYMPTOMS: Yes  Back pain: No  Muscle aches: Yes  Neck pain: No  Swollen joints: Yes  Joint pain: Yes  Bone pain: No  Muscle cramps: No  Muscle weakness: Yes  Joint stiffness: Yes  Bone fracture: No  Bleeding or spotting between periods: No  Heavy or painful periods: No  Irregular periods: Yes  Vaginal discharge: No  Hot flashes: No  Vaginal dryness: No  Genital ulcers: No  Reduced libido: No  Painful intercourse: No  Difficulty with sexual arousal: No  Nervous or Anxious: Yes  Depression: No  Trouble sleeping:  No  Trouble thinking or concentrating: No  Mood changes: No  Panic attacks: Yes

## 2020-02-03 NOTE — NURSING NOTE
"Reason For Visit:   Chief Complaint   Patient presents with     RECHECK     left knee pain       Ht 1.575 m (5' 2\")   Wt 73.2 kg (161 lb 4.8 oz)   BMI 29.50 kg/m      Pain Assessment  Patient Currently in Pain: Yes  0-10 Pain Scale: 5  Primary Pain Location: Knee    Yola Kim ATC    "

## 2020-02-03 NOTE — LETTER
2/3/2020       RE: Abby Aguilera  [address] [address] [place] [address]     Dear Colleague,    Thank you for referring your patient, Abby Aguilera, to the [facility] at [facility]. Please see a copy of my visit note below.    Large Joint Injection/Arthocentesis: L knee joint  Date/Time: 2/3/2020 2:39 PM  Performed by: Don Ovalle MD  Authorized by: Don Ovalle MD     Needle Size:  25 G  Guidance: landmark guided    Location:  Knee      Medications:  40 mg triamcinolone 40 MG/ML; 1 mL lidocaine (PF) 1 %  Outcome:  Tolerated well, no immediate complications  Consent Given by:  Patient  Timeout: timeout called immediately prior to procedure    Prep: patient was prepped and draped in usual sterile fashion          [facility]  [address] [address]  [phone]  Dept: [phone]  ______________________________________________________________________________    Patient: Abby Aguilera   : 1985   MRN: [id number]   February 3, 2020    INVASIVE PROCEDURE SAFETY CHECKLIST    Date: 2/3/2020   Procedure:Left knee cortisone injection  Patient Name: Abby Aguilera  MRN: [id number]  YOB: 1985    Action: Complete sections as appropriate. Any discrepancy results in a HARD COPY until resolved.     PRE PROCEDURE:  Patient ID verified with 2 identifiers (name and  or MRN): Yes  Procedure and site verified with patient/designee (when able): Yes  Accurate consent documentation in medical record: Yes  H&P (or appropriate assessment) documented in medical record: NA  H&P must be up to 20 days prior to procedure and updates within 24 hours of procedure as applicable: NA  Relevant diagnostic and radiology test results appropriately labeled and displayed as applicable: NA  Procedure site(s) marked with provider initials: NA    TIMEOUT:  Time-Out performed immediately prior to  starting procedure, including verbal and active participation of all team members addressing the following:Yes  * Correct patient identify  * Confirmed that the correct side and site are marked  * An accurate procedure consent form  * Agreement on the procedure to be done  * Correct patient position  * Relevant images and results are properly labeled and appropriately displayed  * The need to administer antibiotics or fluids for irrigation purposes during the procedure as applicable   * Safety precautions based on patient history or medication use    DURING PROCEDURE: Verification of correct person, site, and procedures any time the responsibility for care of the patient is transferred to another member of the care team.       The following medications were given:         Prior to injection, verified patient identity using patient's name and date of birth.  Due to injection administration, patient instructed to remain in clinic for 15 minutes  afterwards, and to report any adverse reaction to me immediately.    Joint injection was performed.    Medication Name: lidocaine NDC 93633-101-17  Drug Amount Wasted:  Yes: 4 mg/ml   Vial/Syringe: Single dose vial  Expiration Date:  10/23      Scribed by Yola Kim ATC for Dr. Don Ovalle on February 3, 2020 at 1430 based on the provider's   statements to me.     Yola Kim ATC      Procedure Note    Pre Procedure dx: left knee osteoarthritis    Post  Procedure dx: left knee osteoarthritis    Procedure:  Informed consent obtained. Using sterile technique, the anterior lateral aspect of the left knee was prepped with chlorahexadine x2. 1cc of 40 mg of kenalog and 2 ml of lidocaine was injected into the lateral joint space without difficulty. Tolerated well. Will follow up prn.       Again, thank you for allowing me to participate in the care of your patient.      Sincerely,    Don Ovalle MD

## 2020-07-06 ENCOUNTER — OFFICE VISIT (OUTPATIENT)
Dept: ORTHOPEDICS | Facility: CLINIC | Age: 35
End: 2020-07-06
Payer: COMMERCIAL

## 2020-07-06 DIAGNOSIS — M17.12 TRICOMPARTMENT OSTEOARTHRITIS OF LEFT KNEE: Primary | ICD-10-CM

## 2020-07-06 RX ORDER — LIDOCAINE HYDROCHLORIDE 10 MG/ML
2 INJECTION, SOLUTION EPIDURAL; INFILTRATION; INTRACAUDAL; PERINEURAL
Status: SHIPPED | OUTPATIENT
Start: 2020-07-06

## 2020-07-06 RX ORDER — TRIAMCINOLONE ACETONIDE 40 MG/ML
40 INJECTION, SUSPENSION INTRA-ARTICULAR; INTRAMUSCULAR
Status: SHIPPED | OUTPATIENT
Start: 2020-07-06

## 2020-07-06 RX ADMIN — LIDOCAINE HYDROCHLORIDE 2 ML: 10 INJECTION, SOLUTION EPIDURAL; INFILTRATION; INTRACAUDAL; PERINEURAL at 12:05

## 2020-07-06 RX ADMIN — TRIAMCINOLONE ACETONIDE 40 MG: 40 INJECTION, SUSPENSION INTRA-ARTICULAR; INTRAMUSCULAR at 12:05

## 2020-07-06 NOTE — PROGRESS NOTES
Large Joint Injection/Arthocentesis: L knee joint    Date/Time: 2020 12:05 PM  Performed by: Sole Banerjee APRN CNP  Authorized by: Sole Banerjee APRN CNP     Indications:  Pain  Needle Size:  25 G  Guidance: landmark guided    Location:  Knee      Medications:  40 mg triamcinolone 40 MG/ML; 2 mL lidocaine (PF) 1 %  Outcome:  Tolerated well, no immediate complications  Procedure discussed: discussed risks, benefits, and alternatives    Consent Given by:  Patient  Timeout: timeout called immediately prior to procedure    Prep: patient was prepped and draped in usual sterile fashion          [facility] [place] [address] [address]  [phone]  Dept: [phone]  ______________________________________________________________________________    Patient: Abby Aguilera   : 1985   MRN: [id number]   2020    INVASIVE PROCEDURE SAFETY CHECKLIST    Date: 2020   Procedure: left knee cortisone injection  Patient Name: Abby Aguilera  MRN: [id number]  YOB: 1985    Action: Complete sections as appropriate. Any discrepancy results in a HARD COPY until resolved.     PRE PROCEDURE:  Patient ID verified with 2 identifiers (name and  or MRN): Yes  Procedure and site verified with patient/designee (when able): Yes  Accurate consent documentation in medical record: Yes  H&P (or appropriate assessment) documented in medical record: NA  H&P must be up to 20 days prior to procedure and updates within 24 hours of procedure as applicable: NA  Relevant diagnostic and radiology test results appropriately labeled and displayed as applicable: Yes  Procedure site(s) marked with provider initials: Yes    TIMEOUT:  Time-Out performed immediately prior to starting procedure, including verbal and active participation of all team members addressing the following:Yes  * Correct patient identify  * Confirmed that the correct side and site are  marked  * An accurate procedure consent form  * Agreement on the procedure to be done  * Correct patient position  * Relevant images and results are properly labeled and appropriately displayed  * The need to administer antibiotics or fluids for irrigation purposes during the procedure as applicable   * Safety precautions based on patient history or medication use    DURING PROCEDURE: Verification of correct person, site, and procedures any time the responsibility for care of the patient is transferred to another member of the care team.       The following medications were given:         Prior to injection, verified patient identity using patient's name and date of birth.  Due to injection administration, patient instructed to remain in clinic for 15 minutes  afterwards, and to report any adverse reaction to me immediately.    Joint injection was performed.    Medication Name: Lidocaine NDC 87340-095-54  Drug Amount Wasted:  Yes: 3 mg/ml   Vial/Syringe: Single dose vial  Expiration Date:  11/23    Medication Name Kenalog NDC [address]-2    Scribed by Yola Kim ATC for Sole Banerjee on July 6, 2020 at 1300 based on the provider's   statements to me.     Yola Kim ATC      Procedure note     Preprocedure diagnosis: Left knee osteoarthritis     Postprocedure diagnosis: Left knee osteoarthritis     Procedure:   informed consent was obtained.  Using sterile technique, the anterolateral aspect of the left knee was prepped with chlorhexidine x2.  1 cc of 40 mg of Kenalog and 2 cc of lidocaine were injected into the lateral joint space without difficulty.  Patient tolerated procedure well.    Sole ALLRED, agree with above documentation.

## 2020-07-06 NOTE — LETTER
2020     RE: Abby Aguilera  [address] [address] [place] [address]    Dear Colleague,    Thank you for referring your patient, Abby Aguilera, to the [facility]. Please see a copy of my visit note below.    Large Joint Injection/Arthocentesis: L knee joint    Date/Time: 2020 12:05 PM  Performed by: Sole Banerjee APRN CNP  Authorized by: Sole Banerjee APRN CNP     Indications:  Pain  Needle Size:  25 G  Guidance: landmark guided    Location:  Knee      Medications:  40 mg triamcinolone 40 MG/ML; 2 mL lidocaine (PF) 1 %  Outcome:  Tolerated well, no immediate complications  Procedure discussed: discussed risks, benefits, and alternatives    Consent Given by:  Patient  Timeout: timeout called immediately prior to procedure    Prep: patient was prepped and draped in usual sterile fashion        [facility]  [address] [address]  [phone]  Dept: [phone]  _____________________________________________________________________________  Patient: Abby Aguilera   : 1985   MRN: [id number]   2020    INVASIVE PROCEDURE SAFETY CHECKLIST    Date: 2020   Procedure: left knee cortisone injection  Patient Name: Abby Aguilera  MRN: [id number]  YOB: 1985    Action: Complete sections as appropriate. Any discrepancy results in a HARD COPY until resolved.     PRE PROCEDURE:  Patient ID verified with 2 identifiers (name and  or MRN): Yes  Procedure and site verified with patient/designee (when able): Yes  Accurate consent documentation in medical record: Yes  H&P (or appropriate assessment) documented in medical record: NA  H&P must be up to 20 days prior to procedure and updates within 24 hours of procedure as applicable: NA  Relevant diagnostic and radiology test results appropriately labeled and displayed as applicable: Yes  Procedure site(s) marked with provider initials:  Yes    TIMEOUT:  Time-Out performed immediately prior to starting procedure, including verbal and active participation of all team members addressing the following:Yes  * Correct patient identify  * Confirmed that the correct side and site are marked  * An accurate procedure consent form  * Agreement on the procedure to be done  * Correct patient position  * Relevant images and results are properly labeled and appropriately displayed  * The need to administer antibiotics or fluids for irrigation purposes during the procedure as applicable   * Safety precautions based on patient history or medication use    DURING PROCEDURE: Verification of correct person, site, and procedures any time the responsibility for care of the patient is transferred to another member of the care team.       The following medications were given:     Prior to injection, verified patient identity using patient's name and date of birth.  Due to injection administration, patient instructed to remain in clinic for 15 minutes  afterwards, and to report any adverse reaction to me immediately.    Joint injection was performed.    Medication Name: Lidocaine NDC 11126-493-08  Drug Amount Wasted:  Yes: 3 mg/ml   Vial/Syringe: Single dose vial  Expiration Date:  11/23    Medication Name Kenalog NDC [address]-7    Scribed by Yola Kim ATC for oSle Banerjee on July 6, 2020 at 1300 based on the provider's   statements to me.     Yola Kim ATC      Procedure note     Preprocedure diagnosis: Left knee osteoarthritis     Postprocedure diagnosis: Left knee osteoarthritis     Procedure:   informed consent was obtained.  Using sterile technique, the anterolateral aspect of the left knee was prepped with chlorhexidine x2.  1 cc of 40 mg of Kenalog and 2 cc of lidocaine were injected into the lateral joint space without difficulty.  Patient tolerated procedure well.    I, Sole Banerjee, agree with above documentation.    Again, thank you for  allowing me to participate in the care of your patient.      Sincerely,      ALOK Dillard CNP

## 2020-07-06 NOTE — NURSING NOTE
Reason For Visit:   Chief Complaint   Patient presents with     RECHECK     left knee cortisone injection       There were no vitals taken for this visit.    Pain Assessment  Patient Currently in Pain: Yes  Primary Pain Location: Knee    Yola Kim ATC

## 2020-11-13 ENCOUNTER — TELEPHONE (OUTPATIENT)
Dept: ORTHOPEDICS | Facility: CLINIC | Age: 35
End: 2020-11-13

## 2020-11-13 NOTE — TELEPHONE ENCOUNTER
[facility] Call Center    Phone Message    May a detailed message be left on voicemail: yes     Reason for Call: Other: Patients mother would like a call back about the appt scheduled for 11/30 that has to be rescheduled      Action Taken: Message routed to:  [facility] ([facility]): kermit    Travel Screening: Not Applicable

## 2020-12-07 ENCOUNTER — OFFICE VISIT (OUTPATIENT)
Dept: ORTHOPEDICS | Facility: CLINIC | Age: 35
End: 2020-12-07
Payer: MEDICARE

## 2020-12-07 DIAGNOSIS — M17.12 PRIMARY LOCALIZED OSTEOARTHRITIS OF LEFT KNEE: Primary | ICD-10-CM

## 2020-12-07 PROCEDURE — 20610 DRAIN/INJ JOINT/BURSA W/O US: CPT | Mod: LT | Performed by: ORTHOPAEDIC SURGERY

## 2020-12-07 PROCEDURE — 99207 PR DROP WITH A PROCEDURE: CPT | Performed by: ORTHOPAEDIC SURGERY

## 2020-12-07 RX ORDER — TRIAMCINOLONE ACETONIDE 40 MG/ML
40 INJECTION, SUSPENSION INTRA-ARTICULAR; INTRAMUSCULAR
Status: SHIPPED | OUTPATIENT
Start: 2020-12-07

## 2020-12-07 RX ORDER — LIDOCAINE HYDROCHLORIDE 10 MG/ML
2 INJECTION, SOLUTION EPIDURAL; INFILTRATION; INTRACAUDAL; PERINEURAL
Status: SHIPPED | OUTPATIENT
Start: 2020-12-07

## 2020-12-07 RX ADMIN — TRIAMCINOLONE ACETONIDE 40 MG: 40 INJECTION, SUSPENSION INTRA-ARTICULAR; INTRAMUSCULAR at 08:29

## 2020-12-07 RX ADMIN — LIDOCAINE HYDROCHLORIDE 2 ML: 10 INJECTION, SOLUTION EPIDURAL; INFILTRATION; INTRACAUDAL; PERINEURAL at 08:29

## 2020-12-07 NOTE — LETTER
12/7/2020         RE: Abby Aguilera  [address] [address] [place] [address]        Dear Colleague,    Thank you for referring your patient, Abby Aguilera, to the [facility] [place]. Please see a copy of my visit note below.    Large Joint Injection/Arthocentesis: L knee joint    Date/Time: 12/7/2020 8:29 AM  Performed by: Don Ovalle MD  Authorized by: Don Ovalle MD     Indications:  Pain  Needle Size:  25 G  Guidance: landmark guided    Approach:  Anterolateral  Location:  Knee      Medications:  40 mg triamcinolone 40 MG/ML; 2 mL lidocaine (PF) 1 %  Outcome:  Tolerated well, no immediate complications  Procedure discussed: discussed risks, benefits, and alternatives    Consent Given by:  Patient  Timeout: timeout called immediately prior to procedure    Prep: patient was prepped and draped in usual sterile fashion          Dr. Don Ovalle               Procedure Note    Pre Procedure dx: left knee OA    Post Procedure dx: left knee OA    Procedure:  Using sterile technique, the anterior lateral aspect of the left knee was prepped with chlorahexadine x2. 1cc opf 40 mg of kenalog and 2 ml of marcaine was injected into the lateral joint space. Tolerated procedure well. Follow up prn.      Again, thank you for allowing me to participate in the care of your patient.        Sincerely,        Don Ovalle MD

## 2020-12-07 NOTE — NURSING NOTE
Reason For Visit:   Chief Complaint   Patient presents with     RECHECK     left knee injection        There were no vitals taken for this visit.    Pain Assessment  Patient Currently in Pain: Yes  0-10 Pain Scale: 6  Primary Pain Location: Knee(left)    Jaci Ruiz ATC

## 2020-12-07 NOTE — NURSING NOTE
[facility] [address] [address]  [phone]  Dept: [phone]  ______________________________________________________________________________    Patient: Abby Aguilera   : 1985   MRN: [id number]   2020    INVASIVE PROCEDURE SAFETY CHECKLIST    Date: 2020   Procedure:Left Knee Cortisone Injection  Patient Name: Abby Aguilera  MRN: [id number]  YOB: 1985    Action: Complete sections as appropriate. Any discrepancy results in a HARD COPY until resolved.     PRE PROCEDURE:  Patient ID verified with 2 identifiers (name and  or MRN): Yes  Procedure and site verified with patient/designee (when able): Yes  Accurate consent documentation in medical record: Yes  H&P (or appropriate assessment) documented in medical record: NA  H&P must be up to 20 days prior to procedure and updates within 24 hours of procedure as applicable: NA  Relevant diagnostic and radiology test results appropriately labeled and displayed as applicable: Yes  Procedure site(s) marked with provider initials: NA    TIMEOUT:  Time-Out performed immediately prior to starting procedure, including verbal and active participation of all team members addressing the following:Yes  * Correct patient identify  * Confirmed that the correct side and site are marked  * An accurate procedure consent form  * Agreement on the procedure to be done  * Correct patient position  * Relevant images and results are properly labeled and appropriately displayed  * The need to administer antibiotics or fluids for irrigation purposes during the procedure as applicable   * Safety precautions based on patient history or medication use    DURING PROCEDURE: Verification of correct person, site, and procedures any time the responsibility for care of the patient is transferred to another member of the care team.       The following medications were given:          Prior to injection, verified patient identity using patient's name and date of birth.  Due to injection administration, patient instructed to remain in clinic for 15 minutes  afterwards, and to report any adverse reaction to me immediately.    Joint injection was performed.    Medication Name: Lidocaine NDC 15978-460-28  Drug Amount Wasted:  Yes: 3 mg/ml   Vial/Syringe: Single dose vial  Expiration Date:  4/1/24    Medication Name Kenalog NDC [address]-6    Scribed by Jaci Ruiz ATC for Dr. Ovalle on December 7, 2020 at 8:15am based on the provider's statements to me.     Jaci Ruiz ATC

## 2020-12-07 NOTE — PROGRESS NOTES
Large Joint Injection/Arthocentesis: L knee joint    Date/Time: 12/7/2020 8:29 AM  Performed by: Don Ovalle MD  Authorized by: Don Ovalle MD     Indications:  Pain  Needle Size:  25 G  Guidance: landmark guided    Approach:  Anterolateral  Location:  Knee      Medications:  40 mg triamcinolone 40 MG/ML; 2 mL lidocaine (PF) 1 %  Outcome:  Tolerated well, no immediate complications  Procedure discussed: discussed risks, benefits, and alternatives    Consent Given by:  Patient  Timeout: timeout called immediately prior to procedure    Prep: patient was prepped and draped in usual sterile fashion          Dr. Don Ovalle               Procedure Note    Pre Procedure dx: left knee OA    Post Procedure dx: left knee OA    Procedure:  Using sterile technique, the anterior lateral aspect of the left knee was prepped with chlorahexadine x2. 1cc opf 40 mg of kenalog and 2 ml of marcaine was injected into the lateral joint space. Tolerated procedure well. Follow up prn.

## (undated) RX ORDER — TRIAMCINOLONE ACETONIDE 40 MG/ML
INJECTION, SUSPENSION INTRA-ARTICULAR; INTRAMUSCULAR
Status: DISPENSED
Start: 2020-12-07

## (undated) RX ORDER — LIDOCAINE HYDROCHLORIDE 10 MG/ML
INJECTION, SOLUTION EPIDURAL; INFILTRATION; INTRACAUDAL; PERINEURAL
Status: DISPENSED
Start: 2020-02-03

## (undated) RX ORDER — TRIAMCINOLONE ACETONIDE 40 MG/ML
INJECTION, SUSPENSION INTRA-ARTICULAR; INTRAMUSCULAR
Status: DISPENSED
Start: 2020-07-06

## (undated) RX ORDER — LIDOCAINE HYDROCHLORIDE 10 MG/ML
INJECTION, SOLUTION EPIDURAL; INFILTRATION; INTRACAUDAL; PERINEURAL
Status: DISPENSED
Start: 2020-12-07

## (undated) RX ORDER — TRIAMCINOLONE ACETONIDE 40 MG/ML
INJECTION, SUSPENSION INTRA-ARTICULAR; INTRAMUSCULAR
Status: DISPENSED
Start: 2018-08-06

## (undated) RX ORDER — LIDOCAINE HYDROCHLORIDE 10 MG/ML
INJECTION, SOLUTION INFILTRATION; PERINEURAL
Status: DISPENSED
Start: 2018-08-06

## (undated) RX ORDER — TRIAMCINOLONE ACETONIDE 40 MG/ML
INJECTION, SUSPENSION INTRA-ARTICULAR; INTRAMUSCULAR
Status: DISPENSED
Start: 2020-02-03

## (undated) RX ORDER — TRIAMCINOLONE ACETONIDE 40 MG/ML
INJECTION, SUSPENSION INTRA-ARTICULAR; INTRAMUSCULAR
Status: DISPENSED
Start: 2019-08-19

## (undated) RX ORDER — LIDOCAINE HYDROCHLORIDE 10 MG/ML
INJECTION, SOLUTION EPIDURAL; INFILTRATION; INTRACAUDAL; PERINEURAL
Status: DISPENSED
Start: 2020-07-06

## (undated) RX ORDER — LIDOCAINE HYDROCHLORIDE 10 MG/ML
INJECTION, SOLUTION EPIDURAL; INFILTRATION; INTRACAUDAL; PERINEURAL
Status: DISPENSED
Start: 2019-08-19

## (undated) RX ORDER — TRIAMCINOLONE ACETONIDE 40 MG/ML
INJECTION, SUSPENSION INTRA-ARTICULAR; INTRAMUSCULAR
Status: DISPENSED
Start: 2019-03-11

## (undated) RX ORDER — LIDOCAINE HYDROCHLORIDE 10 MG/ML
INJECTION, SOLUTION EPIDURAL; INFILTRATION; INTRACAUDAL; PERINEURAL
Status: DISPENSED
Start: 2019-03-11